# Patient Record
Sex: FEMALE | Race: WHITE | Employment: FULL TIME | URBAN - METROPOLITAN AREA
[De-identification: names, ages, dates, MRNs, and addresses within clinical notes are randomized per-mention and may not be internally consistent; named-entity substitution may affect disease eponyms.]

---

## 2024-02-29 ENCOUNTER — APPOINTMENT (OUTPATIENT)
Dept: ORTHOPEDIC SURGERY | Facility: CLINIC | Age: 39
End: 2024-02-29
Payer: COMMERCIAL

## 2024-06-25 ENCOUNTER — APPOINTMENT (OUTPATIENT)
Dept: PRIMARY CARE | Facility: CLINIC | Age: 39
End: 2024-06-25
Payer: COMMERCIAL

## 2024-06-25 VITALS
BODY MASS INDEX: 35.86 KG/M2 | OXYGEN SATURATION: 98 % | SYSTOLIC BLOOD PRESSURE: 132 MMHG | DIASTOLIC BLOOD PRESSURE: 78 MMHG | TEMPERATURE: 97.3 F | HEART RATE: 85 BPM | HEIGHT: 63 IN | WEIGHT: 202.4 LBS

## 2024-06-25 DIAGNOSIS — F32.A DEPRESSION, UNSPECIFIED DEPRESSION TYPE: ICD-10-CM

## 2024-06-25 DIAGNOSIS — R74.01 TRANSAMINITIS: ICD-10-CM

## 2024-06-25 DIAGNOSIS — G89.29 CHRONIC HIP PAIN, RIGHT: Primary | ICD-10-CM

## 2024-06-25 DIAGNOSIS — E55.9 VITAMIN D INSUFFICIENCY: ICD-10-CM

## 2024-06-25 DIAGNOSIS — M25.551 CHRONIC HIP PAIN, RIGHT: Primary | ICD-10-CM

## 2024-06-25 DIAGNOSIS — R53.82 CHRONIC FATIGUE: ICD-10-CM

## 2024-06-25 DIAGNOSIS — Z80.3 FAMILY HISTORY OF BREAST CANCER: ICD-10-CM

## 2024-06-25 DIAGNOSIS — E53.8 B12 DEFICIENCY: ICD-10-CM

## 2024-06-25 PROBLEM — D51.0 PERNICIOUS ANEMIA: Status: ACTIVE | Noted: 2024-06-25

## 2024-06-25 PROBLEM — R61 HYPERHIDROSIS: Status: ACTIVE | Noted: 2024-06-25

## 2024-06-25 PROBLEM — G43.909 MIGRAINES: Status: ACTIVE | Noted: 2024-06-25

## 2024-06-25 PROBLEM — F41.9 ANXIETY: Status: ACTIVE | Noted: 2024-06-25

## 2024-06-25 PROBLEM — N64.9 BREAST LESION: Status: RESOLVED | Noted: 2024-06-25 | Resolved: 2024-06-25

## 2024-06-25 PROBLEM — K21.9 GERD (GASTROESOPHAGEAL REFLUX DISEASE): Status: ACTIVE | Noted: 2024-06-25

## 2024-06-25 PROBLEM — E78.5 DYSLIPIDEMIA: Status: ACTIVE | Noted: 2024-06-25

## 2024-06-25 PROCEDURE — 1036F TOBACCO NON-USER: CPT | Performed by: PHYSICIAN ASSISTANT

## 2024-06-25 PROCEDURE — 99214 OFFICE O/P EST MOD 30 MIN: CPT | Performed by: PHYSICIAN ASSISTANT

## 2024-06-25 RX ORDER — MELOXICAM 15 MG/1
1 TABLET ORAL DAILY
COMMUNITY
Start: 2023-09-19 | End: 2024-06-25 | Stop reason: WASHOUT

## 2024-06-25 RX ORDER — TOPIRAMATE 100 MG/1
100 TABLET, COATED ORAL NIGHTLY
COMMUNITY
Start: 2021-07-01

## 2024-06-25 RX ORDER — RIZATRIPTAN BENZOATE 10 MG/1
10 TABLET ORAL ONCE AS NEEDED
COMMUNITY
Start: 2023-06-27

## 2024-06-25 RX ORDER — DULOXETIN HYDROCHLORIDE 20 MG/1
20 CAPSULE, DELAYED RELEASE ORAL DAILY
COMMUNITY
Start: 2024-06-03 | End: 2024-06-25 | Stop reason: SDUPTHER

## 2024-06-25 RX ORDER — DULOXETIN HYDROCHLORIDE 30 MG/1
CAPSULE, DELAYED RELEASE ORAL DAILY
Qty: 150 CAPSULE | Refills: 0 | Status: SHIPPED | OUTPATIENT
Start: 2024-06-25 | End: 2024-09-23

## 2024-06-25 RX ORDER — LORATADINE 10 MG/1
1 TABLET ORAL
COMMUNITY
Start: 2023-12-08

## 2024-06-25 ASSESSMENT — ENCOUNTER SYMPTOMS
DEPRESSION: 0
OCCASIONAL FEELINGS OF UNSTEADINESS: 0
LOSS OF SENSATION IN FEET: 0

## 2024-06-25 ASSESSMENT — PATIENT HEALTH QUESTIONNAIRE - PHQ9
SUM OF ALL RESPONSES TO PHQ9 QUESTIONS 1 AND 2: 0
1. LITTLE INTEREST OR PLEASURE IN DOING THINGS: NOT AT ALL
2. FEELING DOWN, DEPRESSED OR HOPELESS: NOT AT ALL

## 2024-06-25 NOTE — PROGRESS NOTES
Subjective     HPI   Maxine Cunningham is a 38 y.o. year old female patient with presenting to clinic with concern for   Chief Complaint   Patient presents with    New Patient Visit     Concerned about steady weight gain.  25lbs in 6 months. Pt counting calories and exercising when can.  Liver function tests came up high.  Hip pain x 1 year.  Has caused falls.       Maxine presents to clinic to establish as a new patient.     R hip pain x 1 year.- Twisted as she tripped down a hill at [employer] last year. Had Xray at  last July, no Fx. Hip catches and she falls. Has been doing informal PT exercises with the [employer] in the pool.    Weight gain over the past 6 months despite calorie counting. Recommended intuitive eating and focus on hydration, protein, fiber and sufficient complex carbs for energy.    Migraines- sees Dr Barreto. Topamax 100mg at bedtime and sumatriptan as abortive therapy    Mammogram 2/2023 for breast lump, wnl.   Maternal Aunt, Both maternal and paternal grandmothers.Mom's mom diagnosed at 38.  Mom's mom with breast CA and ovarian CA. Not in contact with her mother- but knows she has had a couple of biopsies.    On topamax- method of contraception- hyster.  Labs in March & April, LFTs recheck    HEPATIC FUNCTION PNL  Order: [id number]  Component  Ref Range & Units 2 mo ago   AST  17 - 59 U/L 39   ALT  0 - 34 U/L 52 High    Alkaline Phosphatase  38 - 125 U/L 44   Bilirubin, Total  0.2 - 1.3 mg/dL 0.7   Bilirubin, Conjug  0.0 - 0.4 mg/dL 0.2   Protein, Total  6.2 - 8.2 g/dL 7.4   Albumin  3.5 - 5.0 g/dL 4.3   Resulting Agency [facility]ORAT[facility]     B12   VITAMIN B12 BLOOD  Order: [id number]  Component  Ref Range & Units 3 mo ago   Vitamin B12  239 - 931 pg/mL 569   Comment: Biotin (Vitamin B7) is known to potentially cause an interfering POSITIVE bias   with this assay.  If this result does not correlate clinicallly with your  patient's presentation, it is suggested that Biotin use be discontinued for  2  days prior to re-testing.   Resulting Agency [facility]AT[facility]       Specimen Collected: 03/08/24 08:20   Continue 1000mcg tablet (NOT SL dissolving) daily    TSH BLD  Order: [id number]  Component  Ref Range & Units 3 mo ago   TSH  0.465 - 4.680 uU/mL 1.670      Resulting [facility]ORAT[facility]     Specimen Collected: 03/08/24 08:20    Performed by: [facility] LABORAT[facility] Last Resulted: 03/08/24 10:31       VITAMIN D 25 HYDROXY  Order: [id number]  Component  Ref Range & Units 3 mo ago   Vitamin D 25 Hydroxy  31.0 - 80.0 ng/mL 30.4 Low    Comment:    Classification of 25 OH Vitamin D status:  Insufficiency / Moderate Deficiency: < or = 30 ng/mL  Sufficiency / Optimal Levels: 31 to 80 ng/mL  Potentially Toxic: >100 ng/mL   Resulting [facility]     Specimen Collected: 03/08/24 08:20    Performed by: [facility] LABORAT[facility] Last Resulted: 03/08/24 09:36   Received From: [facility]  Result Received: 06/25/24 14:38         COMP METABOLIC PANEL  Order: [id number]  Component  Ref Range & Units 3 mo ago   Glucose  74 - 106 mg/dL 105   BUN  7 - 17 mg/dL 15   Creatinine  0.52 - 1.04 mg/dL 0.75   eGFR-All Other Races  >60 mL/min/1.73 2 >60   eGFR-African American  >60 mL/min/1.73 2 >60   Comment: MDRD calculation used for eGFR results.   Sodium  137 - 145 mmol/L 141   Potassium  3.5 - 5.1 mmol/L 4.0   Chloride  98 - 107 mmol/L 112 High    CO2  22 - 30 mmol/L 21 Low    Anion Gap  0 - 15 12   AST  17 - 59 U/L 66 High    ALT  0 - 34 U/L 107 High    Alkaline Phosphatase  38 - 125 U/L 55   Bilirubin, Total  0.2 - 1.3 mg/dL 0.7   Calcium  8.4 - 10.2 mg/dL 9.4   Protein, Total  6.2 - 8.2 g/dL 7.2   Albumin  3.5 - 5.0 g/dL 4.3   Resulting [facility]     Specimen Collected: 03/08/24 08:20    Performed by: [facility] LABORAT[facility] Last Resulted: 03/08/24 10:31   Received From: [facility]  Result Received: 06/25/24 14:38     BELGICA BLOOD  Order: [id number]  Component  Ref Range & Units 3 mo ago   BELGICA  Negative Negative   Resulting  [facility] LAB[facility]AT[facility]     Specimen Collected: 03/08/24 08:20    Performed by: [facility] LABORATORY Last Resulted: 03/15/24 13:13   Received From: [facility]  Result Received: 06/25/24 14:38     CBC + DIFF  Order: [id number]  Component  Ref Range & Units 3 mo ago   WBC  4.5 - 11.0 k/uL 6.05   RBC  4.00 - 5.20 m/uL 4.36   Hemoglobin  12.0 - 16.0 g/dL 13.8   Hematocrit  36.0 - 46.0 % 41.5   MCV  80 - 100 fL 95.2   MCH  26 - 34 pG 31.7   MCHC  31 - 37 g/dL 33.3   RDW-CV  11.5 - 14.5 % 11.7   Platelet Count  150 - 450 k/uL 193   NRBC  0 - 0.9 /100 WBC 0   Neut%  40 - 70 % 66.9   Abs Neut (ANC)  1.8 - 7.7 k/uL 4.05   Lymph%  22 - 44 % 23.8   Abs Lymph  1.0 - 4.0 k/uL 1.44   Mono%  4 - 12 % 7.6   Abs Mono  0 - 0.8 k/uL 0.46   Eosin%  0 - 4 % 0.7   Abs Eosin  0 - 0.4 k/uL 0.04   Baso%  0 - 1 % 0.5   Abs Baso  0 - 0.2 k/uL 0.03   Immature Gran  0 - 1.9 % 0.50   Resulting Agency [facility] LABORAT[facility]     Specimen Collected: 03/08/24 08:20    Performed by: [facility] LABORATORY Last Resulted: 03/08/24 08:59   Received From: [facility]  Result Received: 06/25/24 14:38     LIPID PANEL BASIC  Order: [id number]  Component  Ref Range & Units 3 mo ago   Cholesterol, Total  100 - 199 mg/dL 163   Triglyceride  35 - 150 mg/dL 95   HDL Cholesterol  >55 mg/dL 48 Low    LDL Cholesterol  0 - 130 mg/dL 96   HDL/Chol Ratio  10 - 50 % 29   Resulting Agency [facility]     Specimen Collected: 03/08/24 08:20       Patient Active Problem List   Diagnosis    Anxiety    Chronic fatigue    Hyperhidrosis    GERD (gastroesophageal reflux disease)    Dyslipidemia    Migraines    Pernicious anemia    Vitamin D deficiency    Family history of breast cancer    B12 deficiency    Depression    Chronic hip pain, right       Past Medical History:   Diagnosis Date    Drug-induced obesity 07/01/2021    Class 1 drug-induced obesity with serious comorbidity and body mass index (BMI) of 33.0 to 33.9 in adult    Pain in left knee     Acute pain of left knee     "Personal history of other endocrine, nutritional and metabolic disease 2021    History of obesity    Personal history of other endocrine, nutritional and metabolic disease 2021    History of obesity    Personal history of other endocrine, nutritional and metabolic disease 2021    History of obesity    Personal history of other specified conditions 10/20/2020    History of chronic fatigue      Past Surgical History:   Procedure Laterality Date    OTHER SURGICAL HISTORY  07/15/2020    Tonsillectomy    OTHER SURGICAL HISTORY  07/15/2020    Knee surgery    OTHER SURGICAL HISTORY  07/15/2020     section    OTHER SURGICAL HISTORY  10/28/2022    Hysterectomy      Family History   Problem Relation Name Age of Onset    Other (cardiac death) Father      Cardiomyopathy Father        Social History     Tobacco Use    Smoking status: Never    Smokeless tobacco: Never   Substance Use Topics    Alcohol use: Yes     Comment: social        Current Outpatient Medications:     Topamax 100 mg tablet, Take 1 tablet (100 mg) by mouth once daily at bedtime., Disp: , Rfl:     DULoxetine (Cymbalta) 30 mg DR capsule, Take 1 capsule (30 mg) by mouth once daily for 30 days, THEN 1 capsule (30 mg) 2 times a day., Disp: 150 capsule, Rfl: 0    loratadine (Claritin) 10 mg tablet, Take 1 tablet (10 mg) by mouth early in the morning.., Disp: , Rfl:     rizatriptan (Maxalt) 10 mg tablet, Take 1 tablet (10 mg) by mouth 1 time if needed for migraine., Disp: , Rfl:      Review of Systems  Constitutional: Denies fever  HEENT: Denies ST, earache  CVS: Denies Chest pain  Pulmonary: Denies wheezing, SOB  GI: Denies N/V  : Denies dysuria  Musculoskeletal:  Denies myalgia  Neuro: Denies focal weakness or numbness.  Skin: Denies Rashes.  *Review of Systems is negative unless otherwise mentioned in HPI or ROS above.    Objective   /78   Pulse 85   Temp 36.3 °C (97.3 °F)   Ht 1.588 m (5' 2.5\")   Wt 91.8 kg (202 lb 6.4 oz)   " SpO2 98%   BMI 36.43 kg/m²  reviewed Body mass index is 36.43 kg/m².     Physical Exam  Constitutional: NAD.  Resting comfortably.  Head: Atraumatic, normocephalic.  ENT: Moist oral mucosa. Nasal mucosa wnl.   Cardiac: Regular rate & rhythm.   Pulmonary: Lungs clear bilat  GI: Soft, Nontender, nondistended.   Musculoskeletal: No peripheral edema. Right hip lateral tenderness. Inguinal tenderness anterior hip tenderness to musculature. Upper outter buttocks tenderenss.  Skin: No evidence of trauma. No rashes  Psych: Intact judgement and insight.    .Assessment/Plan   Problem List Items Addressed This Visit             ICD-10-CM    Chronic fatigue R53.82    Relevant Medications    DULoxetine (Cymbalta) 30 mg DR capsule    Family history of breast cancer Z80.3    Relevant Orders    Referral to Genetics    B12 deficiency E53.8    Depression F32.A    Relevant Medications    DULoxetine (Cymbalta) 30 mg DR capsule    Chronic hip pain, right - Primary M25.551, G89.29    Relevant Orders    Referral to Orthopaedic Surgery     Other Visit Diagnoses         Codes    Vitamin D insufficiency     E55.9    Transaminitis     R74.01    Relevant Orders    Comprehensive Metabolic Panel

## 2024-07-03 ENCOUNTER — APPOINTMENT (OUTPATIENT)
Dept: OBSTETRICS AND GYNECOLOGY | Facility: CLINIC | Age: 39
End: 2024-07-03
Payer: COMMERCIAL

## 2024-07-03 ENCOUNTER — APPOINTMENT (OUTPATIENT)
Dept: PRIMARY CARE | Facility: CLINIC | Age: 39
End: 2024-07-03
Payer: COMMERCIAL

## 2024-07-18 ENCOUNTER — OFFICE VISIT (OUTPATIENT)
Dept: OBSTETRICS AND GYNECOLOGY | Facility: CLINIC | Age: 39
End: 2024-07-18
Payer: COMMERCIAL

## 2024-07-18 VITALS
HEIGHT: 63 IN | BODY MASS INDEX: 35.83 KG/M2 | DIASTOLIC BLOOD PRESSURE: 90 MMHG | SYSTOLIC BLOOD PRESSURE: 140 MMHG | WEIGHT: 202.2 LBS

## 2024-07-18 DIAGNOSIS — Z01.419 ENCOUNTER FOR GYNECOLOGICAL EXAMINATION (GENERAL) (ROUTINE) WITHOUT ABNORMAL FINDINGS: Primary | ICD-10-CM

## 2024-07-18 PROCEDURE — 99395 PREV VISIT EST AGE 18-39: CPT | Performed by: OBSTETRICS & GYNECOLOGY

## 2024-07-18 PROCEDURE — 1036F TOBACCO NON-USER: CPT | Performed by: OBSTETRICS & GYNECOLOGY

## 2024-07-18 PROCEDURE — 3008F BODY MASS INDEX DOCD: CPT | Performed by: OBSTETRICS & GYNECOLOGY

## 2024-07-18 ASSESSMENT — ENCOUNTER SYMPTOMS
DEPRESSION: 0
LOSS OF SENSATION IN FEET: 0
OCCASIONAL FEELINGS OF UNSTEADINESS: 0

## 2024-07-18 ASSESSMENT — PAIN SCALES - GENERAL: PAINLEVEL: 0-NO PAIN

## 2024-07-18 ASSESSMENT — PATIENT HEALTH QUESTIONNAIRE - PHQ9
1. LITTLE INTEREST OR PLEASURE IN DOING THINGS: NOT AT ALL
SUM OF ALL RESPONSES TO PHQ9 QUESTIONS 1 AND 2: 0
2. FEELING DOWN, DEPRESSED OR HOPELESS: NOT AT ALL

## 2024-07-18 NOTE — PROGRESS NOTES
Maxine Cunningham is a 39 y.o.  who presents for her annual gynecologic exam     Complains: None    Menses:   hysterectomy         History of abnormal pap: yes        OB History          3    Para   2    Term                AB   1    Living   2         SAB        IAB   1    Ectopic        Multiple        Live Births   2               Past Medical History:   Diagnosis Date    Anemia     Drug-induced obesity 2021    Class 1 drug-induced obesity with serious comorbidity and body mass index (BMI) of 33.0 to 33.9 in adult    Migraine     Pain in left knee     Acute pain of left knee    Personal history of other endocrine, nutritional and metabolic disease 2021    History of obesity    Personal history of other endocrine, nutritional and metabolic disease 2021    History of obesity    Personal history of other endocrine, nutritional and metabolic disease 2021    History of obesity    Personal history of other specified conditions 10/20/2020    History of chronic fatigue     Past Surgical History:   Procedure Laterality Date     SECTION, LOW TRANSVERSE  14    ENDOMETRIAL ABLATION      OTHER SURGICAL HISTORY  07/15/2020    Tonsillectomy    OTHER SURGICAL HISTORY  07/15/2020    Knee surgery    OTHER SURGICAL HISTORY  07/15/2020     section    OTHER SURGICAL HISTORY  10/28/2022    Hysterectomy     Family History   Problem Relation Name Age of Onset    Depression Mother Lily     Other (cardiac death) Father None     Cardiomyopathy Father None     Alcohol abuse Father None     Blood Disorder Father None     Heart disease Father None     Breast cancer Mother's Sister Parvin     Breast cancer Maternal Grandmother Yamilex     Cancer Maternal Grandmother Yamilex     COPD Maternal Grandmother Yamilex     Breast cancer Paternal Grandmother J      Social History     Tobacco Use    Smoking status: Never    Smokeless tobacco: Never   Vaping Use    Vaping status: Never Used  "  Substance Use Topics    Alcohol use: Yes     Comment: Very occasionally    Drug use: Never           REVIEW OF SYSTEMS  Abdomen: No abdominal pain, nausea, vomiting, diarrhea, or constipation.   No bloating, early satiety, indigestion, or increased flatulence.  Bladder: No dysuria, gross hematuria, urinary frequency, urinary urgency, or incontinence.  Breast: No breast lumps, nipple d/c, overlying skin changes, redness or skin retraction.  Allergies and current medication updated:Yes        EXAM:  /90   Ht 1.6 m (5' 3\")   Wt 91.7 kg (202 lb 3.2 oz)   BMI 35.82 kg/m²   GENERAL: pleasant, female in no apparent distress  HEENT: Normocephalic, atraumatic, mucus membranes moist and no lesions  NECK: Supple, full range of motion, no adenopathy and thyroid normal  DERMATOLOGY: Normal, without lesions, non-icteric and non-hirsute  BREAST: soft, non-tender, symmetric, no dominant mass, normal nipple-areolar complex, no lymphadenopathy and no nipple discharge  CHEST: Normal inspiratory effort  ABDOMEN: soft, non-tender and no masses  PELVIC: external genitalia normal, normal Bartholin's glands, urethra, Washta's glands, no vulvar lesions,  physiologic discharge present, normal appearing perineal body and perianal region  BIMANUAL: no adnexal masses and non-tender  RECTOVAGINAL: rectovaginal exam negative for any masses or nodularity.  NEURO: alert and oriented x3,exam grossly non-focal  EXTREMITIES: normal        ASSESSMENT:  Healthy female     PLAN:  Education reviewed and Recommended: Safe Sex/STD Prevention, Smoking Cessation, Self Breast Exams, Calcium Supplements, Weight Bearing Exercise, Low Fat, and Low  Cholesterol Diet, Skin Cancer Screening, Depression Evaluation,    Contraception: OCP (estrogen/progesterone).     Repeat Pap every 3-5 years if negative, yearly if history of abnormal Pap or cervical cancer.  HPV typing discussed with patient. No history of JOSE exposure. New Pap smear recommendations " discussed.  STD counseling and prevention reviewed. Condom use encouraged.  HPV vaccine completed.    on.  Physical activity discussed.    Routine Health Maintenance through patient PCP  Follow up one year and as needed.  .     Vanda Osuna MD           This note was produced with voice recognition software and may contain errors in grammar, spelling and content.  If any questions, please feel free to contact me.     I was accompanied by Female Chaperone, LPN  during the exam

## 2024-07-29 LAB
CLINICAL SIG UPDATED INFO: NORMAL
CYTOLOGY CMNT CVX/VAG CYTO-IMP: NORMAL
LAB AP HPV GENOTYPE QUESTION: YES
LAB AP HPV HR: NORMAL
LABORATORY COMMENT REPORT: NORMAL
PATH REPORT.TOTAL CANCER: NORMAL

## 2024-08-21 ENCOUNTER — APPOINTMENT (OUTPATIENT)
Dept: PRIMARY CARE | Facility: CLINIC | Age: 39
End: 2024-08-21
Payer: COMMERCIAL

## 2024-08-21 VITALS
WEIGHT: 206.2 LBS | DIASTOLIC BLOOD PRESSURE: 84 MMHG | BODY MASS INDEX: 36.54 KG/M2 | HEIGHT: 63 IN | SYSTOLIC BLOOD PRESSURE: 116 MMHG | TEMPERATURE: 97.3 F | HEART RATE: 78 BPM | OXYGEN SATURATION: 99 %

## 2024-08-21 DIAGNOSIS — R74.01 TRANSAMINITIS: Primary | ICD-10-CM

## 2024-08-21 DIAGNOSIS — F32.A DEPRESSION, UNSPECIFIED DEPRESSION TYPE: ICD-10-CM

## 2024-08-21 DIAGNOSIS — R53.82 CHRONIC FATIGUE: ICD-10-CM

## 2024-08-21 PROCEDURE — 99213 OFFICE O/P EST LOW 20 MIN: CPT | Performed by: PHYSICIAN ASSISTANT

## 2024-08-21 PROCEDURE — 1036F TOBACCO NON-USER: CPT | Performed by: PHYSICIAN ASSISTANT

## 2024-08-21 PROCEDURE — 3008F BODY MASS INDEX DOCD: CPT | Performed by: PHYSICIAN ASSISTANT

## 2024-08-21 RX ORDER — DULOXETIN HYDROCHLORIDE 30 MG/1
CAPSULE, DELAYED RELEASE ORAL
Qty: 120 CAPSULE
Start: 2024-08-21

## 2024-08-21 ASSESSMENT — PATIENT HEALTH QUESTIONNAIRE - PHQ9
2. FEELING DOWN, DEPRESSED OR HOPELESS: NOT AT ALL
SUM OF ALL RESPONSES TO PHQ9 QUESTIONS 1 AND 2: 0
1. LITTLE INTEREST OR PLEASURE IN DOING THINGS: NOT AT ALL

## 2024-08-21 NOTE — PROGRESS NOTES
Subjective     HPI   Maxine Cunningham is a 39 y.o. year old female patient with presenting to clinic with concern for   Chief Complaint   Patient presents with    Med Management     8 wk. Cymbalta. Was not able to do twice a day. 30 mg seems to be working fine. Was not able to take in the morning.      Last visit- started cymbalta for chronic fatigue & Depression  Doing ok on it once daily. Twice a day was causing fatigue.     Referred  to Genetics for fam hx breast CA - seeing them in Nov.     Weight gain   Weight gain over the past 6 months despite calorie counting. Recommended intuitive eating and focus on hydration, protein, fiber and sufficient complex carbs for energy.     Migraines- sees Dr Barreto. Topamax 100mg at bedtime and sumatriptan as abortive therapy     Mammogram 2/2023 for breast lump, wnl.   Maternal Aunt, Both maternal and paternal grandmothers.Mom's mom diagnosed at 38.  Mom's mom with breast CA and ovarian CA. Not in contact with her mother- but knows she has had a couple of biopsies.     Father passed away from cardiac complications age 49. Saw cardiology.     On topamax- method of contraception- hyster.  Labs in March & April, LFTs rechecked. Trending down. 3rd check due.    Neuro- Dr Barreto- migraines    Ortho- Dr Aj- R hip Labrum tear  GYN Dr Osuna    Patient Active Problem List   Diagnosis    Anxiety    Chronic fatigue    Hyperhidrosis    GERD (gastroesophageal reflux disease)    Dyslipidemia    Migraines    Pernicious anemia    Vitamin D deficiency    Family history of breast cancer    B12 deficiency    Depression    Chronic hip pain, right       Past Medical History:   Diagnosis Date    Anemia     Drug-induced obesity 07/01/2021    Class 1 drug-induced obesity with serious comorbidity and body mass index (BMI) of 33.0 to 33.9 in adult    Migraine     Pain in left knee     Acute pain of left knee    Personal history of other endocrine, nutritional and metabolic disease 07/01/2021     History of obesity    Personal history of other endocrine, nutritional and metabolic disease 2021    History of obesity    Personal history of other endocrine, nutritional and metabolic disease 2021    History of obesity    Personal history of other specified conditions 10/20/2020    History of chronic fatigue      Past Surgical History:   Procedure Laterality Date     SECTION, LOW TRANSVERSE  14    ENDOMETRIAL ABLATION      OTHER SURGICAL HISTORY  07/15/2020    Tonsillectomy    OTHER SURGICAL HISTORY  07/15/2020    Knee surgery    OTHER SURGICAL HISTORY  07/15/2020     section    OTHER SURGICAL HISTORY  10/28/2022    Hysterectomy      Family History   Problem Relation Name Age of Onset    Depression Mother Lily     Other (cardiac death) Father None     Cardiomyopathy Father None     Alcohol abuse Father None     Blood Disorder Father None     Heart disease Father None     Breast cancer Mother's Sister Parvin     Breast cancer Maternal Grandmother Yamilex     Cancer Maternal Grandmother Yamilex     COPD Maternal Grandmother Yamilex     Breast cancer Paternal Grandmother J       Social History     Tobacco Use    Smoking status: Never    Smokeless tobacco: Never   Substance Use Topics    Alcohol use: Yes     Comment: Very occasionally        Current Outpatient Medications:     rizatriptan (Maxalt) 10 mg tablet, Take 1 tablet (10 mg) by mouth 1 time if needed for migraine., Disp: , Rfl:     Topamax 100 mg tablet, Take 1 tablet (100 mg) by mouth once daily at bedtime., Disp: , Rfl:     DULoxetine (Cymbalta) 30 mg DR capsule, Take 1 capsule (30 mg) by mouth once daily, Disp: 120 capsule, Rfl:      Review of Systems  Constitutional: Denies fever  HEENT: Denies ST, earache  CVS: Denies Chest pain  Pulmonary: Denies wheezing, SOB  GI: Denies N/V  : Denies dysuria  Musculoskeletal:  Denies myalgia  Neuro: Denies focal weakness or numbness.  Skin: Denies Rashes.  *Review of Systems is negative  "unless otherwise mentioned in HPI or ROS above.    Objective   /84   Pulse 78   Temp 36.3 °C (97.3 °F)   Ht 1.6 m (5' 3\")   Wt 93.5 kg (206 lb 3.2 oz)   SpO2 99%   BMI 36.53 kg/m²  reviewed Body mass index is 36.53 kg/m².     Physical Exam  Constitutional: NAD.  Resting comfortably.  Head: Atraumatic, normocephalic.  ENT: Moist oral mucosa. Nasal mucosa wnl.   Cardiac: Regular rate & rhythm.   Pulmonary: Lungs clear bilat  GI: Soft, Nontender, nondistended.   Musculoskeletal: No peripheral edema.   Skin: No evidence of trauma. No rashes  Psych: Intact judgement and insight.    .Assessment/Plan   Problem List Items Addressed This Visit             ICD-10-CM    Chronic fatigue R53.82    Relevant Medications    DULoxetine (Cymbalta) 30 mg DR capsule    Depression F32.A    Relevant Medications    DULoxetine (Cymbalta) 30 mg DR capsule     Other Visit Diagnoses         Codes    Transaminitis    -  Primary R74.01    Relevant Orders    Hepatic function panel            "

## 2024-10-29 ENCOUNTER — OFFICE VISIT (OUTPATIENT)
Dept: PRIMARY CARE | Facility: CLINIC | Age: 39
End: 2024-10-29
Payer: COMMERCIAL

## 2024-10-29 VITALS
DIASTOLIC BLOOD PRESSURE: 84 MMHG | SYSTOLIC BLOOD PRESSURE: 126 MMHG | BODY MASS INDEX: 36.56 KG/M2 | HEART RATE: 77 BPM | TEMPERATURE: 97.6 F | OXYGEN SATURATION: 99 % | WEIGHT: 206.4 LBS

## 2024-10-29 DIAGNOSIS — R53.82 CHRONIC FATIGUE: Primary | ICD-10-CM

## 2024-10-29 DIAGNOSIS — M25.551 PAIN IN JOINT INVOLVING RIGHT PELVIC REGION AND THIGH: ICD-10-CM

## 2024-10-29 DIAGNOSIS — F32.A DEPRESSION, UNSPECIFIED DEPRESSION TYPE: ICD-10-CM

## 2024-10-29 PROCEDURE — 99214 OFFICE O/P EST MOD 30 MIN: CPT | Performed by: PHYSICIAN ASSISTANT

## 2024-10-29 PROCEDURE — 1036F TOBACCO NON-USER: CPT | Performed by: PHYSICIAN ASSISTANT

## 2024-10-29 RX ORDER — DULOXETINE 40 MG/1
40 CAPSULE, DELAYED RELEASE ORAL EVERY MORNING
Qty: 90 CAPSULE | Refills: 0 | Status: SHIPPED | OUTPATIENT
Start: 2024-10-29 | End: 2024-10-30 | Stop reason: ALTCHOICE

## 2024-10-30 ENCOUNTER — TELEPHONE (OUTPATIENT)
Dept: PRIMARY CARE | Facility: CLINIC | Age: 39
End: 2024-10-30
Payer: COMMERCIAL

## 2024-10-30 DIAGNOSIS — R53.82 CHRONIC FATIGUE: Primary | ICD-10-CM

## 2024-10-30 DIAGNOSIS — F32.A DEPRESSION, UNSPECIFIED DEPRESSION TYPE: ICD-10-CM

## 2024-10-30 DIAGNOSIS — M25.551 PAIN IN JOINT INVOLVING RIGHT PELVIC REGION AND THIGH: ICD-10-CM

## 2024-10-30 RX ORDER — DULOXETIN HYDROCHLORIDE 20 MG/1
40 CAPSULE, DELAYED RELEASE ORAL DAILY
Qty: 180 CAPSULE | Refills: 1 | Status: SHIPPED | OUTPATIENT
Start: 2024-10-30 | End: 2025-04-28

## 2024-11-04 ENCOUNTER — APPOINTMENT (OUTPATIENT)
Dept: PRIMARY CARE | Facility: CLINIC | Age: 39
End: 2024-11-04
Payer: COMMERCIAL

## 2024-11-20 ENCOUNTER — TELEPHONE (OUTPATIENT)
Dept: GENETICS | Facility: CLINIC | Age: 39
End: 2024-11-20

## 2024-11-20 ENCOUNTER — TELEMEDICINE CLINICAL SUPPORT (OUTPATIENT)
Dept: GENETICS | Facility: CLINIC | Age: 39
End: 2024-11-20
Payer: COMMERCIAL

## 2024-11-20 DIAGNOSIS — Z80.3 FAMILY HISTORY OF BREAST CANCER: ICD-10-CM

## 2024-11-20 PROCEDURE — GENMD PR GENETICS VISIT (MEDICAID/MEDICARE): Performed by: GENETIC COUNSELOR, MS

## 2024-11-20 NOTE — PROGRESS NOTES
Subjective   Patient ID: Maxine Cunningham is a 39 y.o. female who presents for virtual cancer genetic counseling due to a family history of breast and ovarian cancer. We reviewed the hereditary aspects of cancer, and the genetic testing options available to her.    CANCER MEDICAL HISTORY   Personal history of cancer?   -no     PREVIOUS GENETIC TESTING?   -no     CANCER SCREENING HISTORY   Mammograms/MRIS?   -scattered fibroglandular tissue   -: left breast lump, WNL     Breast biopsies?   -no     Ovarian cancer screening (ultrasound or CA-125)?   -no     PAP smear?   -all WNL     Colonoscopy?   -no     Endoscopy?   -no     Hysterectomy?   -: total hysterectomy due to AUB, precancerous cells     Oophorectomy?   -: bilateral salpingectomy due to AUB     Dermatology?   -two benign moles removed     Other cancer screening?   -no     Radiation?   -no    OTHER MEDICAL CONCERNS   -migraines   -DLD   -GERD   -pernicious anemia   -anxiety/depression   -chronic fatigue   -hyperhidrosis     REPRODUCTIVE HISTORY   # children: 2   # pregnancies: 3   Age first birth: 29   Breastfeeding: no   Menarche: 12   Menopause: 37 due to hysterectomy   OCP: yes, <10 years   HRT: no    SOCIAL HISTORY   Smoking: never   Alcohol use: very occasionally   Career:  at [employer]     FAMILY HISTORY:   A 4-generation pedigree was obtained. The family history was significant for the following:     -Mother with multiple breast biopsies, limited information.   -Maternal aunt  of breast cancer in her 40s.   -Maternal grandmother diagnosed with breast cancer at 38 and ovarian cancer in her 40s,  at 85.     Consanguinity and Ashkenazi Islam ancestry were denied. The patient is of [place]/Romanian descent. The remainder of the family history was negative for intellectual disability, birth defects, miscarriages, stillbirths, blindness, deafness, kidney disease, heart disease, cancer, muscle disease, and blood disorders.      Assessment/Plan   Genetic Test Ordered: yes    Maxine Cunningham is a 39 year old woman with a family history of breast and ovarian cancer. Based on her family history, Maxine meets NCCN criteria for testing of the BRCA1 and BRCA2 genes. She is interested in testing, which is recommended, and was ordered today via the CancerNext panel through [facility].      We reviewed genes and chromosomes, inherited forms of breast and ovarian cancer, and the BRCA1 and BRCA2 genes causing HBOC (hereditary breast and ovarian cancer syndrome). We discussed that most cancers are NOT due to an inherited genetic susceptibility. However, in about 5-10% of families, there is an inherited genetic mutation that can make a person more susceptible to developing certain forms of cancer. Within these families, we often see multiple family members with cancer, occurring in multiple generations. In addition, earlier onset and bilateral cancers are suggestive of an inherited form of cancer. There also tends to be a clustering of certain types of cancer in these families, such as breast and ovarian cancer.     We discussed the BRCA1 and BRCA2 genes, which are two genes that have been linked to early-onset breast and/or ovarian cancer. Changes in these genes (sometimes referred to as mutations) are inherited in a dominant pattern and confer up to an 87% lifetime risk for breast cancer. This is elevated compared to the general population risk of 10-12%. In addition, BRCA1 and BRCA2 mutation carriers have up to a 45% lifetime risk for ovarian cancer, which is elevated over the 1.3% general population risk. Mutation carriers who have already been diagnosed with cancer have an increased risk to develop a second, contralateral breast cancer. BRCA2 gene mutation carriers have an increased risk for male breast cancer, prostate cancer, melanoma and pancreatic cancer.    Gene mutations in BRCA1 and BRCA2 are inherited in an autosomal dominant fashion. This  "means that if an individual has a change in either of these genes, their siblings and children have a 50% chance of also having that gene change and a 50% chance of not having the gene change.     We discussed that there are multiple genes associated with increased breast cancer risk. Some genes, like the BRCA1 and BRCA2 genes, are considered highly penetrant breast cancer genes, meaning a mutation in the gene confers a high risk of breast cancer. Other high penetrant breast cancer genes include the following genes: CDH1, PALB2, PTEN, TP53, STK11. Additionally, there are other intermediate (moderate risk) breast cancer genes. Other moderate risk breast cancer genes include the following genes: ANABELLA, BARD1, BMPR1A, CHEK2, NF1, RAD51C, RAD51D. For some of the moderate risk genes, there is often limited information regarding the degree to which a mutation in the gene affects risk of different types of cancers. Additionally, for some of these moderate risk genes, the appropriate management for individuals who have a mutation in one of these genes is not always clear. Our knowledge about the cancer risks associated with mutations in these moderate risk genes is always growing, and we will likely be able to provide more comprehensive information in the future.     We then discussed the type of results that can be obtained with this testing.   The first is a positive result.  The next is a negative result.  The last result is a \"maybe\" which we call a variant of unknown significant, meaning that we found a change in your DNA but we are not sure at this time if it increases the risk for cancer or not.    Additionally, we discussed the Genetic Information Nondiscrimination Act (LENORE). LENORE prohibits discrimination by health insurance plans and employers based on one's genetic information. LENORE does not apply to life, long-term care or disability insurance. These insurers are allowed to use genetic, personal or family health " information to make coverage or premium decisions. Some states have their own genetic protection laws, providing additional security against genetic discrimination for these types of insurance. In addition, LENORE does not apply to:  Members of the [place]   Veterans obtaining health care through the 's Administration  Individuals using the  Health Service, or  Federal employees enrolled in the Federal Employees Health Benefits program (FEHB)    We reviewed the limitations of testing an unaffected individual for a hereditary cancer syndrome. When testing an individual who has not had a cancer diagnosis, like Maxine, a negative test result may have limited utility in defining future cancer risks, as it is unclear whether the affected family members had a mutation in one of the genes that the unaffected individual did not inherit, or alternatively, whether there is another untested risk factor to explain the family history of cancer that is shared between the unaffected individual and their family members. Maxine is still interested in testing which will be ordered today via the CancerNext panel from [facility].     Results are typically available within ~3-4 weeks. Maxine's test results may be released to her when they are reported. We will have a scheduled a time to review these results in detail. If Maxine chooses to view her results in advance of her scheduled follow-up visit, we may not be available to discuss them at that exact time. Most people choose to review their results with their provider at their scheduled follow-up visit.     PLAN:  Maxine elected to undergo genetic testing via [facility]'s 39-gene CancerNext panel.   Verbal consent for testing was given.   [facility] will send a DNA/RNA blood kit to her home.  Maxine was instructed to bring the test kit to a  outpatient blood draw lab to have her blood drawn for testing (using the test tubes provided in the kit).   After her blood is drawn,  the  lab will send her sample out to [facility] for analysis.  Results are typically available in 3-4 weeks.  Maxine was scheduled for a virtual follow-up appointment to review her test results on 12/17/24 at 11:00.  We remain available to Maxine or her family members at [phone] if any questions arise regarding information discussed at today's visit.    Nan Burkett MS [facility]  Licensed Genetic Counselor  [facility]  [phone]    Reviewed by  Alana Wilkerson MD  Clinical   [facility]  [phone]    Total time spent on day of encounter: 26 minutes

## 2024-12-04 ENCOUNTER — LAB (OUTPATIENT)
Dept: LAB | Facility: LAB | Age: 39
End: 2024-12-04
Payer: COMMERCIAL

## 2024-12-04 DIAGNOSIS — Z80.3 FAMILY HISTORY OF BREAST CANCER: ICD-10-CM

## 2024-12-04 DIAGNOSIS — R74.01 TRANSAMINITIS: ICD-10-CM

## 2024-12-04 LAB
ALBUMIN SERPL BCP-MCNC: 4.3 G/DL (ref 3.4–5)
ALP SERPL-CCNC: 46 U/L (ref 33–110)
ALT SERPL W P-5'-P-CCNC: 19 U/L (ref 7–45)
ANION GAP SERPL CALC-SCNC: 12 MMOL/L (ref 10–20)
AST SERPL W P-5'-P-CCNC: 19 U/L (ref 9–39)
BILIRUB DIRECT SERPL-MCNC: 0.1 MG/DL (ref 0–0.3)
BILIRUB SERPL-MCNC: 0.5 MG/DL (ref 0–1.2)
BUN SERPL-MCNC: 17 MG/DL (ref 6–23)
CALCIUM SERPL-MCNC: 8.8 MG/DL (ref 8.6–10.3)
CHLORIDE SERPL-SCNC: 109 MMOL/L (ref 98–107)
CO2 SERPL-SCNC: 20 MMOL/L (ref 21–32)
CREAT SERPL-MCNC: 0.83 MG/DL (ref 0.5–1.05)
EGFRCR SERPLBLD CKD-EPI 2021: >90 ML/MIN/1.73M*2
GLUCOSE SERPL-MCNC: 85 MG/DL (ref 74–99)
POTASSIUM SERPL-SCNC: 4.3 MMOL/L (ref 3.5–5.3)
PROT SERPL-MCNC: 6.2 G/DL (ref 6.4–8.2)
SODIUM SERPL-SCNC: 137 MMOL/L (ref 136–145)

## 2024-12-04 PROCEDURE — 36415 COLL VENOUS BLD VENIPUNCTURE: CPT

## 2024-12-04 PROCEDURE — 82248 BILIRUBIN DIRECT: CPT

## 2024-12-04 PROCEDURE — 80053 COMPREHEN METABOLIC PANEL: CPT

## 2024-12-16 LAB
COMMENTS - MP RESULT TYPE: NORMAL
SCAN RESULT: NORMAL

## 2024-12-17 ENCOUNTER — APPOINTMENT (OUTPATIENT)
Dept: GENETICS | Facility: CLINIC | Age: 39
End: 2024-12-17
Payer: COMMERCIAL

## 2024-12-17 DIAGNOSIS — Z80.3 FAMILY HISTORY OF BREAST CANCER: ICD-10-CM

## 2024-12-17 PROCEDURE — GENMD PR GENETICS VISIT (MEDICAID/MEDICARE): Performed by: GENETIC COUNSELOR, MS

## 2024-12-17 NOTE — PROGRESS NOTES
Maxine Cunningham is a 39 year old woman with a family history of breast and ovarian cancer. Maxine was initially seen virtually in the Cancer Genetics Clinic on 11-20-24 for counseling and coordination of testing. Based on her family history, [facility] 39 gene CancerNext panel was ordered. Maxine was seen today for a virtual follow-up appointment to review the results of this testing. Our discussion is summarized below.    Maxine's results were NEGATIVE, meaning that no disease causing mutations were identified. A genetic cause for her family history of cancer has not been identified.        Negative genetic testing results can be explained by several possibilities:  It is estimated that a small percentage of gene mutations are not identified by current testing technology. [unfilled]'s negative testing results make a hereditary cancer syndrome less likely, but they do not rule it out completely.  There could be other genes that may increase the risk for cancer for which there is no testing available at this time.   The cancer in the family is not due to an inherited risk factor, and instead is sporadic or due to chance.    While it is reassuring that no mutations were detected in the tested genes, Maxine remains at an increased risk for developing breast cancer based on her family history. Maxine's lifetime risk of developing breast cancer was calculated based on personal risk factors and family history using the Tyrer-Cuzick risk model and is estimated to be 20.8% compared to 11-12% for the general population. Of note, available models can only estimate risk and cannot account for all risk factors. Breast cancer risk is dynamic and can change with age and family history.     For women with a lifetime risk of breast cancer estimated to be >20%, The National Comprehensive Cancer Network (NCCN) guidelines include:   Breast awareness,   Clinical breast examination performed by a physician every 6-12 months.  Annual mammogram  to begin 10 years prior to the youngest breast cancer diagnosis in the family, but not less than age 30.   The NCCN also suggests screening with breast MRI as an adjunct to mammogram in the high risk setting, with breast MRI screening beginning 10 years prior to the youngest breast cancer diagnosis in the family, but not less than age 25.  Recommendations and options for management should be discussed with managing physicians.   A referral was placed for Maxine to be seen in the high risk breast cancer clinic given her elevated lifetime risk to develop breast cancer.    Similarly, although her results were negative, Maxine is still considered to be at an increased risk over the general population to develop ovarian cancer based on her family history. Studies suggest that women who have one second-degree relative (aunts, uncles, grandparents, grandchildren, nieces, nephews, half-siblings) with ovarian cancer, such as Maxine, have about a 3.5% risk to develop ovarian cancer compared to the general population risk of less than 2% (around 1.3%) (UptoDate). Since we do not have good screening for ovarian cancer, Maxine may wish to speak with her healthcare providers about ways to reduce her risk of ovarian cancer, such as birth control pills and/or surgery (having the ovaries removed). Maxine will share this information with her physicians..    Of note, given the family history of cancer, we discussed that Maxine's other relatives are still candidates for their own genetic testing. There may be a gene mutation in the family that Maxine did not inherit. If interested, Maxine's relatives may contact our Cancer Genetics Clinic by calling [phone]. They may also visit <[email or web address] to find a genetic counselor in their area.     No genetic testing is recommended for Maxine's children at this time unless there is a strong family history of cancer on their father's side.    In addition, it is  important for Maxine to follow all other age-related cancer screenings per her primary care providers' recommendations, such as Pap smears, colonoscopies, etc.    PLAN:  Referral placed for high risk breast cancer clinic.   Maxine has electronic access to her genetic testing results.  Our understanding of genetic contribution to cancer diagnoses is always evolving, so there may be additional testing recommended in the future.   Maxine can contact us in ~5 years to determine if there have been any changes since our discussion today.  Please call me at [phone] with any questions.    Nan Burkett MS [facility]  Licensed Genetic Counselor  [place] [facility]  [phone]    Reviewed by:  Alana Wilkerson MD  Clinical   [facility]  [phone]

## 2025-02-04 DIAGNOSIS — N76.0 ACUTE VAGINITIS: Primary | ICD-10-CM

## 2025-02-04 RX ORDER — FLUCONAZOLE 150 MG/1
150 TABLET ORAL ONCE
Qty: 1 TABLET | Refills: 0 | Status: SHIPPED | OUTPATIENT
Start: 2025-02-04 | End: 2025-02-04

## 2025-02-20 ENCOUNTER — APPOINTMENT (OUTPATIENT)
Dept: PRIMARY CARE | Facility: CLINIC | Age: 40
End: 2025-02-20
Payer: COMMERCIAL

## 2025-02-20 VITALS
DIASTOLIC BLOOD PRESSURE: 70 MMHG | BODY MASS INDEX: 35.08 KG/M2 | WEIGHT: 198 LBS | OXYGEN SATURATION: 99 % | HEIGHT: 63 IN | TEMPERATURE: 97.3 F | SYSTOLIC BLOOD PRESSURE: 112 MMHG | HEART RATE: 74 BPM

## 2025-02-20 DIAGNOSIS — N95.2 POST-MENOPAUSAL ATROPHIC VAGINITIS: ICD-10-CM

## 2025-02-20 DIAGNOSIS — F32.A DEPRESSION, UNSPECIFIED DEPRESSION TYPE: Primary | ICD-10-CM

## 2025-02-20 DIAGNOSIS — Z82.49 FAMILY HISTORY OF HEART DISEASE: ICD-10-CM

## 2025-02-20 DIAGNOSIS — F41.9 ANXIETY: ICD-10-CM

## 2025-02-20 PROCEDURE — 99214 OFFICE O/P EST MOD 30 MIN: CPT | Performed by: PHYSICIAN ASSISTANT

## 2025-02-20 PROCEDURE — 3008F BODY MASS INDEX DOCD: CPT | Performed by: PHYSICIAN ASSISTANT

## 2025-02-20 PROCEDURE — 1036F TOBACCO NON-USER: CPT | Performed by: PHYSICIAN ASSISTANT

## 2025-02-20 RX ORDER — ESTRADIOL 0.1 MG/G
2 CREAM VAGINAL DAILY
Qty: 42.5 G | Refills: 3 | Status: SHIPPED | OUTPATIENT
Start: 2025-02-20 | End: 2025-05-21

## 2025-02-20 RX ORDER — ESCITALOPRAM OXALATE 10 MG/1
10 TABLET ORAL DAILY
Qty: 90 TABLET | Refills: 1 | Status: SHIPPED | OUTPATIENT
Start: 2025-02-20

## 2025-02-20 ASSESSMENT — PATIENT HEALTH QUESTIONNAIRE - PHQ9
SUM OF ALL RESPONSES TO PHQ9 QUESTIONS 1 AND 2: 0
2. FEELING DOWN, DEPRESSED OR HOPELESS: NOT AT ALL
1. LITTLE INTEREST OR PLEASURE IN DOING THINGS: NOT AT ALL

## 2025-02-20 NOTE — PROGRESS NOTES
Subjective     HPI   Maxine Cunningham is a 39 y.o. year old female patient with presenting to clinic with concern for   Chief Complaint   Patient presents with    Follow-up     6 mth. Weaned herself off the Cymbalta about a month ago. Does not think it was working for her. Was put on it for pain. Doing better with the PT.       BP Readings from Last 5 Encounters:   02/20/25 112/70   10/29/24 126/84   08/21/24 116/84   07/18/24 140/90   06/25/24 132/78     R hip pain-   Dr Aj.   mild trochanteric bursitis and potentially articular cartilage fissuring   No surgical intervention recommended  - she did PT in pool previously with minimal benefit  I recommend considering pelvic floor therapy.     Depression & Chronic Fatigue  -OFF cymbalta, was on this for pain.  Start lexapro 10       Weight gain resolved.  Weight gain over the past 6 months despite calorie counting. Recommended intuitive eating and focus on hydration, protein, fiber and sufficient complex carbs for energy.  Wt Readings from Last 7 Encounters:   02/20/25 89.8 kg (198 lb)   10/29/24 93.6 kg (206 lb 6.4 oz)   08/21/24 93.5 kg (206 lb 3.2 oz)   07/18/24 91.7 kg (202 lb 3.2 oz)   06/25/24 91.8 kg (202 lb 6.4 oz)   12/13/22 80.7 kg (178 lb)   11/10/22 81.3 kg (179 lb 2 oz)     Migraines  Dr Barreto  -Topamax 100mg at bedtime  -rizatriptan/Maxalt (tried this before imitrex without improvement)  Less common migraines, doing well     Family Hx Breast Cancer in 2nd degree relatives  Mammogram 2/2023 for breast lump, wnl.   Genetics 12/2024  NO genetic cause for her family history of cancer identified    Family hx Cardiac disease  Father passed away from cardiac complications age 49.   Referred to cardiology        Patient Active Problem List   Diagnosis    Anxiety    Chronic fatigue    Hyperhidrosis    GERD (gastroesophageal reflux disease)    Dyslipidemia    Migraines    Pernicious anemia    Vitamin D deficiency    Family history of breast cancer    B12  deficiency    Depression    Chronic hip pain, right       Past Medical History:   Diagnosis Date    Anemia     Drug-induced obesity 2021    Class 1 drug-induced obesity with serious comorbidity and body mass index (BMI) of 33.0 to 33.9 in adult    Migraine     Pain in left knee     Acute pain of left knee    Personal history of other endocrine, nutritional and metabolic disease 2021    History of obesity    Personal history of other endocrine, nutritional and metabolic disease 2021    History of obesity    Personal history of other endocrine, nutritional and metabolic disease 2021    History of obesity    Personal history of other specified conditions 10/20/2020    History of chronic fatigue      Past Surgical History:   Procedure Laterality Date     SECTION, LOW TRANSVERSE  14    ENDOMETRIAL ABLATION      OTHER SURGICAL HISTORY  07/15/2020    Tonsillectomy    OTHER SURGICAL HISTORY  07/15/2020    Knee surgery    OTHER SURGICAL HISTORY  07/15/2020     section    OTHER SURGICAL HISTORY  10/28/2022    Hysterectomy      Family History   Problem Relation Name Age of Onset    Depression Mother Lily     Other (cardiac death) Father None     Cardiomyopathy Father None     Alcohol abuse Father None     Blood Disorder Father None     Heart disease Father None     Breast cancer Mother's Sister Parvin     Breast cancer Maternal Grandmother Yamilex     Cancer Maternal Grandmother Yamilex     COPD Maternal Grandmother Yamilex     Breast cancer Paternal Grandmother J       Social History     Tobacco Use    Smoking status: Never    Smokeless tobacco: Never   Substance Use Topics    Alcohol use: Yes     Comment: Very occasionally        Current Outpatient Medications:     rizatriptan (Maxalt) 10 mg tablet, Take 1 tablet (10 mg) by mouth 1 time if needed for migraine., Disp: , Rfl:     Topamax 100 mg tablet, Take 1 tablet (100 mg) by mouth once daily at bedtime., Disp: , Rfl:     escitalopram  "(Lexapro) 10 mg tablet, Take 1 tablet (10 mg) by mouth once daily., Disp: 90 tablet, Rfl: 1    estradiol (Estrace) 0.01 % (0.1 mg/gram) vaginal cream, Insert 0.5 Applicatorfuls (2 g) into the vagina once daily. Apply pea sized amount  to vagina nightly for 1 week then every Monday/Wednesday/Friday., Disp: 42.5 g, Rfl: 3     Review of Systems  Constitutional: Denies fever  HEENT: Denies ST, earache  CVS: Denies Chest pain  Pulmonary: Denies wheezing, SOB  GI: Denies N/V  : Denies dysuria  Musculoskeletal:  Denies myalgia  Neuro: Denies focal weakness or numbness.  Skin: Denies Rashes.  *Review of Systems is negative unless otherwise mentioned in HPI or ROS above.    Objective   /70   Pulse 74   Temp 36.3 °C (97.3 °F)   Ht 1.6 m (5' 3\")   Wt 89.8 kg (198 lb)   SpO2 99%   BMI 35.07 kg/m²  reviewed Body mass index is 35.07 kg/m².     Physical Exam  Constitutional: NAD.  Resting comfortably.  Head: Atraumatic, normocephalic.  ENT: Moist oral mucosa. Nasal mucosa wnl.   Cardiac: Regular rate & rhythm.   Pulmonary: Lungs clear bilat  GI: Soft, Nontender, nondistended.   Musculoskeletal: No peripheral edema.   Skin: No evidence of trauma. No rashes  Psych: Intact judgement and insight.    .Assessment/Plan   Problem List Items Addressed This Visit             ICD-10-CM    Anxiety F41.9    Relevant Medications    escitalopram (Lexapro) 10 mg tablet    Depression - Primary F32.A    Relevant Medications    escitalopram (Lexapro) 10 mg tablet     Other Visit Diagnoses         Codes    Post-menopausal atrophic vaginitis     N95.2    Relevant Medications    estradiol (Estrace) 0.01 % (0.1 mg/gram) vaginal cream    Family history of heart disease     Z82.49    Relevant Orders    Referral to Cardiology            "

## 2025-04-01 ENCOUNTER — TELEMEDICINE (OUTPATIENT)
Dept: PRIMARY CARE | Facility: CLINIC | Age: 40
End: 2025-04-01
Payer: COMMERCIAL

## 2025-04-01 DIAGNOSIS — H10.32 ACUTE BACTERIAL CONJUNCTIVITIS OF LEFT EYE: Primary | ICD-10-CM

## 2025-04-01 PROCEDURE — 99213 OFFICE O/P EST LOW 20 MIN: CPT | Performed by: PHYSICIAN ASSISTANT

## 2025-04-01 PROCEDURE — 1036F TOBACCO NON-USER: CPT | Performed by: PHYSICIAN ASSISTANT

## 2025-04-01 RX ORDER — POLYMYXIN B SULFATE AND TRIMETHOPRIM 1; 10000 MG/ML; [USP'U]/ML
1 SOLUTION OPHTHALMIC EVERY 6 HOURS
Qty: 10 ML | Refills: 0 | Status: SHIPPED | OUTPATIENT
Start: 2025-04-01 | End: 2025-04-06

## 2025-04-01 NOTE — PROGRESS NOTES
An interactive audio and video telecommunication system which permits real time communications between the patient (at the originating site) and provider (at the distant site) was utilized to provide this telehealth service.   Verbal consent was requested and obtained from Maxine Cunningham on this date, 04/01/25 for a telehealth visit and the patient's location was confirmed at the time of the visit.    Subjective    Maxine Cunningham is a 39 y.o. year old female patient presenting for virtual visit   Chief Complaint   Patient presents with    Eye Drainage     Left eye irritation x 1 day      Patient  presents with a 1 day history of left eye irritation, itching, crusting.  Initially both eyes have been irritated for a week with seasonal allergies, but 2 nights ago left eye became quite painful and itchy and inflamed. Worse yesterday, somewhat improved today but red and lacrimating. Right eye wnl.     No foreign body suspected. Has No fevers.  Denies recent rhinorrhea or sinus pressure.  No rashes.  No vomiting.   Has been rubbing eyes due to allergies. Vision is normal. No yellow/green discharge.  No contact lenses  Not diabetic        Patient Active Problem List   Diagnosis    Anxiety    Chronic fatigue    Hyperhidrosis    GERD (gastroesophageal reflux disease)    Dyslipidemia    Migraines    Pernicious anemia    Vitamin D deficiency    Family history of breast cancer    B12 deficiency    Depression    Chronic hip pain, right       Past Medical History:   Diagnosis Date    Anemia     Drug-induced obesity 07/01/2021    Class 1 drug-induced obesity with serious comorbidity and body mass index (BMI) of 33.0 to 33.9 in adult    Migraine     Pain in left knee     Acute pain of left knee    Personal history of other endocrine, nutritional and metabolic disease 07/01/2021    History of obesity    Personal history of other endocrine, nutritional and metabolic disease 02/01/2021    History of obesity    Personal history of  other endocrine, nutritional and metabolic disease 2021    History of obesity    Personal history of other specified conditions 10/20/2020    History of chronic fatigue      Past Surgical History:   Procedure Laterality Date     SECTION, LOW TRANSVERSE  14    ENDOMETRIAL ABLATION      OTHER SURGICAL HISTORY  07/15/2020    Tonsillectomy    OTHER SURGICAL HISTORY  07/15/2020    Knee surgery    OTHER SURGICAL HISTORY  07/15/2020     section    OTHER SURGICAL HISTORY  10/28/2022    Hysterectomy      Family History   Problem Relation Name Age of Onset    Depression Mother Lily     Other (cardiac death) Father None     Cardiomyopathy Father None     Alcohol abuse Father None     Blood Disorder Father None     Heart disease Father None     Breast cancer Mother's Sister Parvin     Breast cancer Maternal Grandmother Yamilex     Cancer Maternal Grandmother Yamilex     COPD Maternal Grandmother Yamilex     Breast cancer Paternal Grandmother J       Social History     Tobacco Use    Smoking status: Never    Smokeless tobacco: Never   Substance Use Topics    Alcohol use: Yes     Comment: Very occasionally        Current Outpatient Medications:     escitalopram (Lexapro) 10 mg tablet, Take 1 tablet (10 mg) by mouth once daily., Disp: 90 tablet, Rfl: 1    estradiol (Estrace) 0.01 % (0.1 mg/gram) vaginal cream, Insert 0.5 Applicatorfuls (2 g) into the vagina once daily. Apply pea sized amount  to vagina nightly for 1 week then every Monday/Wednesday/Friday., Disp: 42.5 g, Rfl: 3    polymyxin B sulf-trimethoprim (Polytrim) ophthalmic solution, Administer 1 drop into both eyes every 6 hours for 5 days., Disp: 10 mL, Rfl: 0    rizatriptan (Maxalt) 10 mg tablet, Take 1 tablet (10 mg) by mouth 1 time if needed for migraine., Disp: , Rfl:     Topamax 100 mg tablet, Take 1 tablet (100 mg) by mouth once daily at bedtime., Disp: , Rfl:      Review of Systems    Review of Systems:   Constitutional: Denies fever  HEENT:  Denies ST, earache  CVS: Denies Chest pain  Pulmonary: Denies wheezing, SOB  GI: Denies N/V  : Denies dysuria  Musculoskeletal:  Denies myalgia  Neuro: Denies focal weakness or numbness.  Skin: Denies Rashes.  *Review of Systems is negative unless otherwise mentioned in HPI or ROS above.     Objective    VITALS  Pt does not have vitals available at time of visit.    Exam       Limited physical exam in virtual platform  Nontoxic. No acute distress.  Nonlabored breathing.  Left EOM intact, conjunctiva moderately injected. Yellow exudate present at medial canthus and lower lash line.    Assessment/Plan      Problem List Items Addressed This Visit    None  Visit Diagnoses       Acute bacterial conjunctivitis of left eye    -  Primary    Relevant Medications    polymyxin B sulf-trimethoprim (Polytrim) ophthalmic solution

## 2025-04-08 ENCOUNTER — APPOINTMENT (OUTPATIENT)
Dept: CARDIOLOGY | Facility: CLINIC | Age: 40
End: 2025-04-08
Payer: COMMERCIAL

## 2025-04-15 ENCOUNTER — APPOINTMENT (OUTPATIENT)
Dept: CARDIOLOGY | Facility: CLINIC | Age: 40
End: 2025-04-15
Payer: COMMERCIAL

## 2025-05-06 ENCOUNTER — APPOINTMENT (OUTPATIENT)
Dept: CARDIOLOGY | Facility: CLINIC | Age: 40
End: 2025-05-06
Payer: COMMERCIAL

## 2025-06-17 ENCOUNTER — APPOINTMENT (OUTPATIENT)
Dept: CARDIOLOGY | Facility: CLINIC | Age: 40
End: 2025-06-17
Payer: COMMERCIAL

## 2025-06-21 ASSESSMENT — ENCOUNTER SYMPTOMS
MUSCLE WEAKNESS: 0
LOSS OF MOTION: 0
TINGLING: 0
INABILITY TO BEAR WEIGHT: 1
LOSS OF SENSATION: 0

## 2025-06-25 ENCOUNTER — APPOINTMENT (OUTPATIENT)
Dept: PRIMARY CARE | Facility: CLINIC | Age: 40
End: 2025-06-25
Payer: COMMERCIAL

## 2025-07-30 ENCOUNTER — APPOINTMENT (OUTPATIENT)
Dept: CARDIOLOGY | Facility: CLINIC | Age: 40
End: 2025-07-30
Payer: COMMERCIAL

## 2025-08-12 ENCOUNTER — APPOINTMENT (OUTPATIENT)
Dept: PRIMARY CARE | Facility: CLINIC | Age: 40
End: 2025-08-12
Payer: COMMERCIAL

## 2025-09-10 ENCOUNTER — APPOINTMENT (OUTPATIENT)
Dept: PRIMARY CARE | Facility: CLINIC | Age: 40
End: 2025-09-10
Payer: COMMERCIAL